# Patient Record
Sex: MALE | Race: WHITE | Employment: FULL TIME | URBAN - METROPOLITAN AREA
[De-identification: names, ages, dates, MRNs, and addresses within clinical notes are randomized per-mention and may not be internally consistent; named-entity substitution may affect disease eponyms.]

---

## 2020-12-21 ENCOUNTER — OFFICE VISIT (OUTPATIENT)
Dept: FAMILY MEDICINE | Facility: CLINIC | Age: 25
End: 2020-12-21
Payer: COMMERCIAL

## 2020-12-21 VITALS
DIASTOLIC BLOOD PRESSURE: 82 MMHG | HEART RATE: 78 BPM | RESPIRATION RATE: 12 BRPM | SYSTOLIC BLOOD PRESSURE: 128 MMHG | BODY MASS INDEX: 28.89 KG/M2 | WEIGHT: 218 LBS | HEIGHT: 73 IN | OXYGEN SATURATION: 98 % | TEMPERATURE: 97.9 F

## 2020-12-21 DIAGNOSIS — F41.1 GAD (GENERALIZED ANXIETY DISORDER): ICD-10-CM

## 2020-12-21 DIAGNOSIS — Z00.00 ROUTINE GENERAL MEDICAL EXAMINATION AT A HEALTH CARE FACILITY: Primary | ICD-10-CM

## 2020-12-21 DIAGNOSIS — F34.1 DYSTHYMIA: ICD-10-CM

## 2020-12-21 DIAGNOSIS — F33.1 MODERATE EPISODE OF RECURRENT MAJOR DEPRESSIVE DISORDER (H): ICD-10-CM

## 2020-12-21 DIAGNOSIS — R41.3 MEMORY LOSS: ICD-10-CM

## 2020-12-21 DIAGNOSIS — Z23 NEED FOR VACCINATION: ICD-10-CM

## 2020-12-21 PROBLEM — K21.9 GERD (GASTROESOPHAGEAL REFLUX DISEASE): Status: ACTIVE | Noted: 2018-05-23

## 2020-12-21 PROCEDURE — 99385 PREV VISIT NEW AGE 18-39: CPT | Mod: 25 | Performed by: PHYSICIAN ASSISTANT

## 2020-12-21 PROCEDURE — 99214 OFFICE O/P EST MOD 30 MIN: CPT | Mod: 25 | Performed by: PHYSICIAN ASSISTANT

## 2020-12-21 PROCEDURE — 90471 IMMUNIZATION ADMIN: CPT | Performed by: PHYSICIAN ASSISTANT

## 2020-12-21 PROCEDURE — 90715 TDAP VACCINE 7 YRS/> IM: CPT | Performed by: PHYSICIAN ASSISTANT

## 2020-12-21 PROCEDURE — 90686 IIV4 VACC NO PRSV 0.5 ML IM: CPT | Performed by: PHYSICIAN ASSISTANT

## 2020-12-21 PROCEDURE — 90472 IMMUNIZATION ADMIN EACH ADD: CPT | Performed by: PHYSICIAN ASSISTANT

## 2020-12-21 RX ORDER — LAMOTRIGINE 25 MG/1
TABLET ORAL
Qty: 50 TABLET | Refills: 0 | Status: SHIPPED | OUTPATIENT
Start: 2020-12-21 | End: 2021-03-08

## 2020-12-21 RX ORDER — LORAZEPAM 0.5 MG/1
TABLET ORAL
COMMUNITY
Start: 2020-05-14

## 2020-12-21 RX ORDER — LAMOTRIGINE 200 MG/1
200 TABLET ORAL
COMMUNITY
Start: 2020-12-03 | End: 2021-03-08

## 2020-12-21 RX ORDER — HYDROXYZINE HYDROCHLORIDE 25 MG/1
25 TABLET, FILM COATED ORAL
COMMUNITY
Start: 2020-05-14 | End: 2020-12-21

## 2020-12-21 RX ORDER — ALBUTEROL SULFATE 90 UG/1
2 AEROSOL, METERED RESPIRATORY (INHALATION)
COMMUNITY
Start: 2019-09-27 | End: 2021-03-08

## 2020-12-21 SDOH — HEALTH STABILITY: MENTAL HEALTH: HOW OFTEN DO YOU HAVE 6 OR MORE DRINKS ON ONE OCCASION?: NEVER

## 2020-12-21 SDOH — HEALTH STABILITY: MENTAL HEALTH: HOW OFTEN DO YOU HAVE A DRINK CONTAINING ALCOHOL?: MONTHLY OR LESS

## 2020-12-21 SDOH — HEALTH STABILITY: MENTAL HEALTH: HOW MANY STANDARD DRINKS CONTAINING ALCOHOL DO YOU HAVE ON A TYPICAL DAY?: 1 OR 2

## 2020-12-21 ASSESSMENT — MIFFLIN-ST. JEOR: SCORE: 2032.72

## 2020-12-21 ASSESSMENT — ANXIETY QUESTIONNAIRES
3. WORRYING TOO MUCH ABOUT DIFFERENT THINGS: MORE THAN HALF THE DAYS
5. BEING SO RESTLESS THAT IT IS HARD TO SIT STILL: NOT AT ALL
GAD7 TOTAL SCORE: 9
6. BECOMING EASILY ANNOYED OR IRRITABLE: SEVERAL DAYS
7. FEELING AFRAID AS IF SOMETHING AWFUL MIGHT HAPPEN: SEVERAL DAYS
1. FEELING NERVOUS, ANXIOUS, OR ON EDGE: MORE THAN HALF THE DAYS
IF YOU CHECKED OFF ANY PROBLEMS ON THIS QUESTIONNAIRE, HOW DIFFICULT HAVE THESE PROBLEMS MADE IT FOR YOU TO DO YOUR WORK, TAKE CARE OF THINGS AT HOME, OR GET ALONG WITH OTHER PEOPLE: SOMEWHAT DIFFICULT
2. NOT BEING ABLE TO STOP OR CONTROL WORRYING: MORE THAN HALF THE DAYS

## 2020-12-21 ASSESSMENT — PATIENT HEALTH QUESTIONNAIRE - PHQ9
5. POOR APPETITE OR OVEREATING: SEVERAL DAYS
SUM OF ALL RESPONSES TO PHQ QUESTIONS 1-9: 13
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
## 2020-12-21 NOTE — PATIENT INSTRUCTIONS
Taper off lamictal as follows:  Take 4 tablets (100 mg) by mouth daily for 5 days, THEN 3 tablets (75 mg) daily for 5 days, THEN 2 tablets (50 mg) daily for 5 days, THEN 1 tablet (25 mg) daily for 5 days and stop.     You can start the zoloft now at 50 mg.        Preventive Health Recommendations  Male Ages 21 - 25     Yearly exam:             See your health care provider every year in order to  o   Review health changes.   o   Discuss preventive care.    o   Review your medicines if your doctor has prescribed any.    You should be tested each year for STDs (sexually transmitted diseases).     Talk to your provider about cholesterol testing.      If you are at risk for diabetes, you should have a diabetes test (fasting glucose).    Shots: Get a flu shot each year. Get a tetanus shot every 10 years.     Nutrition:    Eat at least 5 servings of fruits and vegetables daily.     Eat whole-grain bread, whole-wheat pasta and brown rice instead of white grains and rice.     Get adequate calcium and Vitamin D.     Lifestyle    Exercise for at least 150 minutes a week (30 minutes a day, 5 days a week). This will help you control your weight and prevent disease.     Limit alcohol to one drink per day.     No smoking.     Wear sunscreen to prevent skin cancer.     See your dentist every six months for an exam and cleaning.

## 2020-12-22 ASSESSMENT — ANXIETY QUESTIONNAIRES: GAD7 TOTAL SCORE: 9

## 2021-01-28 DIAGNOSIS — F33.1 MODERATE EPISODE OF RECURRENT MAJOR DEPRESSIVE DISORDER (H): ICD-10-CM

## 2021-03-02 ENCOUNTER — TELEPHONE (OUTPATIENT)
Dept: FAMILY MEDICINE | Facility: CLINIC | Age: 26
End: 2021-03-02

## 2021-03-02 NOTE — TELEPHONE ENCOUNTER
Patient called he received 2 immunizations on 12/21/20 OV and asking for names.   He is donating plasma and they needed to know.  Patient informed he was given:   Influenza Vaccine IM > 6 months Valent IIV4   Tdap (Adacel,Boostrix)    Patient voiced understanding and agreement.  Rosa Arita RN  [facility]

## 2021-03-08 ENCOUNTER — TELEPHONE (OUTPATIENT)
Dept: FAMILY MEDICINE | Facility: CLINIC | Age: 26
End: 2021-03-08

## 2021-03-08 NOTE — TELEPHONE ENCOUNTER
Patient called today.    Patient is requesting to see Tita ESPINO at [facility] this week.    Patient has a UTI and also needs medical clearance for plasma donation and paperwork.    Patient would like to be seen in clinic.    Please contact patient today.    Thank you.    Central Scheduling  Bettye ZAMORA

## 2021-03-08 NOTE — TELEPHONE ENCOUNTER
It looks like patient was seen for the first time by Tita Tomas for routine visit 12/21/20.    Routed to Tita Tomas, schedule is full, any options to add patient on for visit for multiple issues (?UTI and paperwork for blood donation)?    Otherwise, I will offer to schedule him with an alternate provider in the near future.    Wen Orlando RN  [facility]

## 2021-03-09 NOTE — TELEPHONE ENCOUNTER
Called patient and LVM to call back and make appointment.    Jenna Artis on 3/9/2021 at 11:32 AM

## 2021-03-09 NOTE — TELEPHONE ENCOUNTER
I could see him Wednesday 3/10 at either:  7 am (take the 7:20 am slot but have him come at 7 am please)  10:20 am  OR  Thursday 9, 10:20 am or 1 pm (ok to change this one to an in office)      Otherwise, please find another provider if the above do not work this week.     Tita Tomas PA-C

## 2021-03-11 ENCOUNTER — OFFICE VISIT (OUTPATIENT)
Dept: FAMILY MEDICINE | Facility: CLINIC | Age: 26
End: 2021-03-11
Payer: COMMERCIAL

## 2021-03-11 VITALS
RESPIRATION RATE: 14 BRPM | WEIGHT: 221 LBS | DIASTOLIC BLOOD PRESSURE: 78 MMHG | BODY MASS INDEX: 29.29 KG/M2 | TEMPERATURE: 97.2 F | SYSTOLIC BLOOD PRESSURE: 100 MMHG | HEART RATE: 68 BPM | HEIGHT: 73 IN

## 2021-03-11 DIAGNOSIS — R35.0 URINARY FREQUENCY: Primary | ICD-10-CM

## 2021-03-11 DIAGNOSIS — Z11.3 SCREEN FOR STD (SEXUALLY TRANSMITTED DISEASE): ICD-10-CM

## 2021-03-11 LAB
ALBUMIN UR-MCNC: NEGATIVE MG/DL
APPEARANCE UR: CLEAR
BILIRUB UR QL STRIP: NEGATIVE
COLOR UR AUTO: YELLOW
GLUCOSE UR STRIP-MCNC: NEGATIVE MG/DL
HGB UR QL STRIP: NEGATIVE
KETONES UR STRIP-MCNC: NEGATIVE MG/DL
LEUKOCYTE ESTERASE UR QL STRIP: NEGATIVE
NITRATE UR QL: NEGATIVE
PH UR STRIP: 5.5 PH (ref 5–7)
SOURCE: NORMAL
SP GR UR STRIP: >1.03 (ref 1–1.03)
UROBILINOGEN UR STRIP-ACNC: 0.2 EU/DL (ref 0.2–1)

## 2021-03-11 PROCEDURE — 99213 OFFICE O/P EST LOW 20 MIN: CPT | Performed by: PHYSICIAN ASSISTANT

## 2021-03-11 PROCEDURE — 81003 URINALYSIS AUTO W/O SCOPE: CPT | Performed by: PHYSICIAN ASSISTANT

## 2021-03-11 PROCEDURE — 87491 CHLMYD TRACH DNA AMP PROBE: CPT | Performed by: PHYSICIAN ASSISTANT

## 2021-03-11 ASSESSMENT — ENCOUNTER SYMPTOMS
NAUSEA: 0
ABDOMINAL PAIN: 0
CONSTIPATION: 1
NERVOUS/ANXIOUS: 0
FREQUENCY: 1
HEMATOCHEZIA: 0
DYSURIA: 0
VOMITING: 0
LIGHT-HEADEDNESS: 0
FEVER: 0
SHORTNESS OF BREATH: 0
DIARRHEA: 0

## 2021-03-11 ASSESSMENT — MIFFLIN-ST. JEOR: SCORE: 2046.33

## 2021-03-11 NOTE — PROGRESS NOTES
Assessment & Plan     Urinary frequency  Patient is a 25-year-old male who presents to clinic due to increased urinary frequency at night between the time that he returns home and going to bed.  Concerns for UTI.  Vital signs normal.  Physical exam without abdominal tenderness, CVA tenderness, rebound, or guarding to suggest acute abdomen, pyelonephritis, or kidney stone.  Urinalysis negative for infection.  No hematuria present.  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  Patient notes he does often drink more fluids and carbonated water at night.  Symptoms are most likely due to increased fluid intake during that time frame.  Patient to follow-up with any new or worsening symptoms.  - UA reflex to Microscopic and Culture    Screen for STD (sexually transmitted disease)  Patient is low risk.  We will complete baseline screening per guidelines.  - Chlamydia trachomatis PCR       See Patient Instructions    Return in about 2 weeks (around 3/25/2021), or if symptoms worsen or fail to improve.    MARIA E Schaefer [facility] KATHLEEN Meléndez is a 25 year old who presents for the following health issues:    HPI       Genitourinary - Male  Onset/Duration: 4 weeks. Patient notes when at home and going to bed he feels like he has to use the bathroom every 30 minutes. Patient notes history of constipation treated with dietary changes. BM daily.   Description:   Dysuria (painful urination): no  Hematuria (blood in urine): no  Frequency: YES- before going to bed  Waking at night to urinate: no  Hesitancy (delay in urine): no  Retention (unable to empty): YES  Decrease in urinary flow: no  Incontinence: no  Progression of Symptoms:  same  Accompanying Signs & Symptoms:  Fever: no  Back/Flank pain: no  Urethral discharge: no  Testicle lumps/masses/pain: no  Nausea and/or vomiting: no  Abdominal pain: no  History:   History of frequent UTI s: no  History  "of kidney stones: no  History of hernias: no  Personal or Family history of Prostate problems: no  Sexually active: YES- No STD concerns  Precipitating or alleviating factors: None  Therapies tried and outcome: none    History of hernia/abdomina, hemorrhoid     Review of Systems   Constitutional: Negative for fever.   HENT: Negative for congestion.    Respiratory: Negative for shortness of breath.    Cardiovascular: Negative for chest pain.   Gastrointestinal: Positive for constipation (intermittent). Negative for abdominal pain, diarrhea, hematochezia, nausea and vomiting.   Genitourinary: Positive for frequency. Negative for discharge, dysuria and urgency.   Skin: Negative for rash.   Neurological: Negative for light-headedness.   Psychiatric/Behavioral: The patient is not nervous/anxious.             Objective    /78   Pulse 68   Temp 97.2  F (36.2  C) (Tympanic)   Resp 14   Ht 1.854 m (6' 1\")   Wt 100.2 kg (221 lb)   BMI 29.16 kg/m    Body mass index is 29.16 kg/m .  Physical Exam  Vitals signs and nursing note reviewed.   Constitutional:       General: He is not in acute distress.     Appearance: Normal appearance.   HENT:      Head: Normocephalic and atraumatic.      Mouth/Throat:      Mouth: Mucous membranes are moist.      Pharynx: Oropharynx is clear.   Eyes:      Extraocular Movements: Extraocular movements intact.      Pupils: Pupils are equal, round, and reactive to light.   Neck:      Musculoskeletal: Normal range of motion.   Cardiovascular:      Rate and Rhythm: Normal rate and regular rhythm.      Heart sounds: Normal heart sounds.   Pulmonary:      Effort: Pulmonary effort is normal.      Breath sounds: Normal breath sounds.   Abdominal:      General: Bowel sounds are normal. There is no distension.      Palpations: Abdomen is soft.      Tenderness: There is no abdominal tenderness. There is no right CVA tenderness, left CVA tenderness, guarding or rebound.   Musculoskeletal: Normal " range of motion.   Skin:     General: Skin is warm and dry.   Neurological:      General: No focal deficit present.      Mental Status: He is alert.   Psychiatric:         Mood and Affect: Mood normal.         Behavior: Behavior normal.            Results for orders placed or performed in visit on 03/11/21   UA reflex to Microscopic and Culture     Status: None    Specimen: Midstream Urine   Result Value Ref Range    Color Urine Yellow     Appearance Urine Clear     Glucose Urine Negative NEG^Negative mg/dL    Bilirubin Urine Negative NEG^Negative    Ketones Urine Negative NEG^Negative mg/dL    Specific Gravity Urine >1.030 1.003 - 1.035    Blood Urine Negative NEG^Negative    pH Urine 5.5 5.0 - 7.0 pH    Protein Albumin Urine Negative NEG^Negative mg/dL    Urobilinogen Urine 0.2 0.2 - 1.0 EU/dL    Nitrite Urine Negative NEG^Negative    Leukocyte Esterase Urine Negative NEG^Negative    Source Midstream Urine

## 2021-03-11 NOTE — PATIENT INSTRUCTIONS
We will complete screening lab work for urinary tract infection today.  Keep in mind that your symptoms may be related to when you are drinking fluids and liquids you are drinking.  Certain fluids such as pop/carbonated water, tea, alcohol, and coffee can be bladder irritants and he can urinate more often.    Please reach out with any questions or concerns, or if you develop new or worsening symptoms.

## 2021-03-12 LAB
C TRACH DNA SPEC QL NAA+PROBE: NEGATIVE
SPECIMEN SOURCE: NORMAL

## 2021-04-15 ENCOUNTER — PRE VISIT (OUTPATIENT)
Dept: NEUROLOGY | Facility: CLINIC | Age: 26
End: 2021-04-15

## 2021-04-15 NOTE — TELEPHONE ENCOUNTER
FUTURE VISIT INFORMATION      FUTURE VISIT INFORMATION:    Date: 4/21/2021    Time: 1030am    Location: [facility] [place]  REFERRAL INFORMATION:    Referring provider:  Self     Referring providers clinic:      Reason for visit/diagnosis  Memory Loss     RECORDS REQUESTED FROM:       Clinic name Comments Records Status Imaging Status   [facility]  MR Head-9/14/2018    CT Head-9/13/2018 Care Everywhere Requested to PACS          Internal AYANA Tomas-12/21/2020 Epic N/A                       4/15/2021-Request for images faxed to [facility] Radiology-MR @ 123pm    4/20/2021-[facility] Images now in PACS-MR @ 548am

## 2021-04-20 ASSESSMENT — ENCOUNTER SYMPTOMS
CONSTIPATION: 0
JAUNDICE: 0
RECTAL PAIN: 0
DIARRHEA: 0
BLOATING: 0
BOWEL INCONTINENCE: 0
BLOOD IN STOOL: 0
NAUSEA: 0
ABDOMINAL PAIN: 1
HEARTBURN: 0
VOMITING: 0

## 2021-04-21 ENCOUNTER — OFFICE VISIT (OUTPATIENT)
Dept: NEUROLOGY | Facility: CLINIC | Age: 26
End: 2021-04-21
Payer: COMMERCIAL

## 2021-04-21 VITALS
HEART RATE: 72 BPM | DIASTOLIC BLOOD PRESSURE: 86 MMHG | RESPIRATION RATE: 16 BRPM | OXYGEN SATURATION: 98 % | SYSTOLIC BLOOD PRESSURE: 144 MMHG

## 2021-04-21 DIAGNOSIS — R41.3 MEMORY LOSS: Primary | ICD-10-CM

## 2021-04-21 PROCEDURE — 99205 OFFICE O/P NEW HI 60 MIN: CPT | Performed by: PSYCHIATRY & NEUROLOGY

## 2021-04-21 ASSESSMENT — PAIN SCALES - GENERAL: PAINLEVEL: NO PAIN (0)
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
## 2021-04-21 NOTE — PATIENT INSTRUCTIONS
I do suspect you have some deficits in memory, and I am concerned about your changes in high-school as well as recent time loss.  While seizures could cause some of these issues, I do suspect that your depression and medications may also play a role.  We will look into seizures and your cogntiive function with further testing.    EEG (video, 3 hour, sleep deprived)  Neuropsychology mandi

## 2021-05-10 DIAGNOSIS — F33.1 MODERATE EPISODE OF RECURRENT MAJOR DEPRESSIVE DISORDER (H): ICD-10-CM

## 2021-05-10 NOTE — TELEPHONE ENCOUNTER
Date of Last Office Visit: 3/8/2021  Date of Next Office Visit: 6/15/2021  No shows since last visit: none  Cancellations since last visit: none    Medication requested: Sertraline 50 mg tablet Date last ordered: 3/8/2021 Qty: 30 Refills: 1     Review of MN ?: n/a    Lapse in medication adherence greater than 5 days?: no  If yes, call patient and gather details: no  Medication refill request verified as identical to current order?: yes  Result of Last DAM, VPA, Li+ Level, CBC, or Carbamazepine Level (at or since last visit): N/A    []Medication refilled per  Medication Refill in Ambulatory Care  policy.  [x]Medication unable to be refilled by RN due to criteria not met as indicated below:    []Eligibility - not seen in the last year   []Supervision - no future appointment   []Compliance - no shows, cancellations or lapse in therapy   []Verification - order discrepancy   []Controlled medication   []Medication not included in policy   []90-day supply request   [x]Other

## 2021-05-11 NOTE — TELEPHONE ENCOUNTER
Patient states that his insurance will only cover medications if the prescription is for a 90 day supply.  His current prescription for Sertraline is only for 30 day supply.  He is wondering if it could be re-prescribed as a 90 day supply so his insurance will cover it.    Patient uses [facility] pharmacy: [phone]    Patients best number is one listed in Epic, a message can be left if he does not answer.

## 2021-05-13 ENCOUNTER — ANCILLARY PROCEDURE (OUTPATIENT)
Dept: NEUROLOGY | Facility: CLINIC | Age: 26
End: 2021-05-13
Attending: PSYCHIATRY & NEUROLOGY
Payer: COMMERCIAL

## 2021-05-13 DIAGNOSIS — R41.3 MEMORY LOSS: ICD-10-CM

## 2021-05-13 PROCEDURE — 95718 EEG PHYS/QHP 2-12 HR W/VEEG: CPT | Performed by: PSYCHIATRY & NEUROLOGY

## 2021-05-13 PROCEDURE — 95700 EEG CONT REC W/VID EEG TECH: CPT | Performed by: PSYCHIATRY & NEUROLOGY

## 2021-05-13 PROCEDURE — 95713 VEEG 2-12 HR CONT MNTR: CPT | Performed by: PSYCHIATRY & NEUROLOGY

## 2021-06-15 ENCOUNTER — VIRTUAL VISIT (OUTPATIENT)
Dept: PSYCHIATRY | Facility: CLINIC | Age: 26
End: 2021-06-15
Payer: COMMERCIAL

## 2021-06-15 DIAGNOSIS — F33.1 MODERATE EPISODE OF RECURRENT MAJOR DEPRESSIVE DISORDER (H): Primary | ICD-10-CM

## 2021-06-15 PROCEDURE — 99213 OFFICE O/P EST LOW 20 MIN: CPT | Mod: 95 | Performed by: NURSE PRACTITIONER

## 2021-06-15 RX ORDER — SERTRALINE HYDROCHLORIDE 100 MG/1
100 TABLET, FILM COATED ORAL DAILY
Qty: 90 TABLET | Refills: 0 | Status: SHIPPED | OUTPATIENT
Start: 2021-06-15 | End: 2021-11-26

## 2021-06-15 ASSESSMENT — ANXIETY QUESTIONNAIRES
7. FEELING AFRAID AS IF SOMETHING AWFUL MIGHT HAPPEN: SEVERAL DAYS
5. BEING SO RESTLESS THAT IT IS HARD TO SIT STILL: NOT AT ALL
3. WORRYING TOO MUCH ABOUT DIFFERENT THINGS: MORE THAN HALF THE DAYS
2. NOT BEING ABLE TO STOP OR CONTROL WORRYING: SEVERAL DAYS
6. BECOMING EASILY ANNOYED OR IRRITABLE: SEVERAL DAYS
GAD7 TOTAL SCORE: 7
IF YOU CHECKED OFF ANY PROBLEMS ON THIS QUESTIONNAIRE, HOW DIFFICULT HAVE THESE PROBLEMS MADE IT FOR YOU TO DO YOUR WORK, TAKE CARE OF THINGS AT HOME, OR GET ALONG WITH OTHER PEOPLE: SOMEWHAT DIFFICULT
1. FEELING NERVOUS, ANXIOUS, OR ON EDGE: SEVERAL DAYS

## 2021-06-15 ASSESSMENT — PATIENT HEALTH QUESTIONNAIRE - PHQ9
SUM OF ALL RESPONSES TO PHQ QUESTIONS 1-9: 11
5. POOR APPETITE OR OVEREATING: SEVERAL DAYS
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
## 2021-06-16 ASSESSMENT — ANXIETY QUESTIONNAIRES: GAD7 TOTAL SCORE: 7

## 2021-07-06 ENCOUNTER — OFFICE VISIT (OUTPATIENT)
Dept: NEUROLOGY | Facility: CLINIC | Age: 26
End: 2021-07-06
Payer: COMMERCIAL

## 2021-07-06 DIAGNOSIS — F33.2 SEVERE RECURRENT MAJOR DEPRESSION WITHOUT PSYCHOTIC FEATURES (H): ICD-10-CM

## 2021-07-06 DIAGNOSIS — F06.8 OTHER SPECIFIED MENTAL DISORDERS DUE TO KNOWN PHYSIOLOGICAL CONDITION: ICD-10-CM

## 2021-07-06 DIAGNOSIS — R41.844 FRONTAL LOBE AND EXECUTIVE FUNCTION DEFICIT: Primary | ICD-10-CM

## 2021-07-06 DIAGNOSIS — F41.9 ANXIETY: ICD-10-CM

## 2021-07-06 NOTE — PROGRESS NOTES
Patient was seen for neuropsychological evaluation at the request of Dr. Jesse Dominguez, for the purposes of diagnostic clarification and treatment planning.  1 hrs 39 min of test administration and scoring were provided by this writer, Laura Mejia.  Please see Dr. Kaveh Boles's report for a full interpretation of the findings.
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
## 2021-07-07 NOTE — PROGRESS NOTES
Name: Medhat Jackman MRN: [id number]  : 1995 LATHAM: 2021  Staff: JOSE LUIS Tech: NEHEMIAH Age: 25  Sex: Male Hand: Right Educ: 13-15  Vision: 20/20 ?with correction / ?without correction    ORIENTATION     Time  -0     Place       Personal info         WAIS-IV     WMI: 95         Raw SSa     Similarities  25 10     Block Design  54 12     Digit Span  27 9      Arithmetic  13 9     Coding  64 8    CRISTEL-O    Raw    T %ile     Copy    33.0     11-16     Time to Copy  288        WRAT 4   SS      Word Reading  121      COWAT (FAS)   Raw: 52   T: 56  Animals   Raw:  20  T-score:  41    BOSTON NAMING TEST   Raw: 56   T: 46     COMPLEX IDEATIONAL MATERIAL   Raw:  T: 58    TRAILS  Raw  Err T    A 19  0 55   B 44  0 54    STROOP Raw T   Word 83 37   Color  74 46       Color/Word  46 51          FOZIA Short   Raw: 15/15 %ile: 86    GROOVED PEGBOARD    Raw  T Drops   RH  52  62 0   LH 60  56 0    BDI-II  Raw:  29  Interpretation: Severe    KIRBY  Raw:  11  Interpretation: Mild    HVLT-R Form 1      Trial 1 2 3 Total      7 11 12  30  Raw T     Total Learning (1-3) 30 53     Delayed Recall  10 47     Percent Retention 83 38     Recognition Hits/FP 12/0 58    BVMT-R Form 1     Trial 1 2 3 Total      12 12 12  36  Raw T / %ile     Total Learning (1-3) 36 68     Delayed Recall  12 60     Percent Retention 100 >16th     Recognition Hits/FP 6/0 >16th    DCT E-score: 10      MMPI-2-RF   RCd: 79 VRIN-r:  58   RC1: 56 ZOILA-r:  73T   RC2: 76 F-r:  79   RC3: 41 Fp-r:  85   RC4: 59 Fs:  66   RC6: 56 FBS-r:  67   RC7: 70 RBS:  84   RC8: 56 L-r:  42   RC9: 43 K-r:  38
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
## 2021-07-09 NOTE — PROGRESS NOTES
"[facility] ([facility])  July 13, 2021    Behavioral Health Clinician Progress Note    Patient Name: Medhat Jackman      Telemedicine Visit: The patient's condition can be safely assessed and treated via synchronous audio and visual telemedicine encounter.      Reason for Telemedicine Visit: telephone The patient has been notified of the following:      \"We have found that certain health care needs can be provided without the need for a face to face visit.  This service lets us provide the care you need with a phone conversation.       I will have full access to your [facility] medical record during this entire phone call.   I will be taking notes for your medical record.      Since this is like an office visit, we will bill your insurance company for this service.       There are potential benefits and risks of telephone visits (e.g. limits to patient confidentiality) that differ from in-person visits.?  Confidentiality still applies for telephone services, and nobody will record the visit.  It is important to be in a quiet, private space that is free of distractions (including cell phone or other devices) during the visit.??      If during the course of the call I believe a telephone visit is not appropriate, you will not be charged for this service\"     Consent has been obtained for this service by care team member: Yes     Originating Site (Patient Location): Patient's home    Distant Site (Provider Location): Provider Remote Setting- Home Office    Consent:  The patient/guardian has verbally consented to: the potential risks and benefits of telemedicine (video visit) versus in person care; bill my insurance or make self-payment for services provided; and responsibility for payment of non-covered services.     Mode of Communication:  telephone    As the provider I attest to compliance with applicable laws and regulations related to telemedicine.         Service Type:  Individual      Service " Location:   Phone call (patient / identified key support person reached)     Session Start Time: 1030am  Session End Time: 1055am      Session Length: 16 - 37      Attendees: Client    Visit Activities (Refresh list every visit): [facility] [place] Only    Diagnostic Assessment Date: 03/08/2021  See Flowsheets for today's PHQ-9 and SHIRLEY-7 results  Previous PHQ-9:   PHQ-9 SCORE 3/8/2021 6/15/2021 7/13/2021   PHQ-9 Total Score 8 11 11       Previous SHIRLEY-7:   SHIRLEY-7 SCORE 3/8/2021 6/15/2021 7/13/2021   Total Score 5 7 8       WHODAS  WHODAS 2.0 Total Score 7/13/2021   Total Score 17        AUDIT  No flowsheet data found.    DATA  Extended Session (60+ minutes): No  Interactive Complexity: No  Crisis: No    Medication Compliance:  Yes      Chemical Use Review:   Substance Use: Chemical use reviewed, no active concerns identified      Tobacco Use: No current tobacco use.      Current Stressors / Issues:  Significant improvement in mood since increased dose. Depressive episodes are shorter in duration and less intense.  Decrease SI but still happening. Has safety plan in place. Sees his therapist regularly and she is aware of this.  Feeling more positive. Increased fatigue. Has tried to improve sleep cycle, decrease caffeine intake. Gets woken up and can't get back to sleep.  Some increased irritability.    INTERVENTION:  Motivational Interviewing     MI Intervention: Co-Developed Goal: manage depression, Expressed Empathy/Understanding, Supported Autonomy, Collaboration, Evocation, Permission to raise concern or advise, Open-ended questions, Reflections: simple and complex, Change talk (evoked) and Reframe      Change Talk Expressed by the Patient: Desire to change Ability to change Reasons to change Need to change Committment to change Activation Taking steps     Provider Response to Change Talk: E - Evoked more info from patient about behavior change, A - Affirmed patient's thoughts, decisions, or attempts at behavior change, R -  Reflected patient's change talk and S - Summarized patient's change talk statements        Review of Symptoms per patient report:  Depression: Change in sleep, Change in energy level and Irritability  Aleksandra:  No Symptoms  Psychosis: No Symptoms  Anxiety: Excessive worry and Nervousness  Panic:  No symptoms  Post Traumatic Stress Disorder:  Nightmares   Eating Disorder: No Symptoms  ADD / ADHD:  No symptoms  Conduct Disorder: No symptoms  Autism Spectrum Disorder: No symptoms  Obsessive Compulsive Disorder: No Symptoms      Changes in Health Issues:   None reported    Assessment: Current Emotional / Mental Status (status of significant symptoms):  Risk status (Self / Other harm or suicidal ideation)  Patient has had a history of suicidal ideation: 2018 and homicidal ideation: 2018  Patient denies current fears or concerns for personal safety.  Patient denies current or recent suicidal ideation or behaviors.  Patient denies current or recent homicidal ideation or behaviors.  Patient denies current or recent self injurious behavior or ideation.  Patient denies other safety concerns.  A safety and risk management plan has not been developed at this time, however patient was encouraged to call [place] [phone] should there be a change in any of these risk factors.    Appearance:   unable to assess (phone)   Eye Contact:   unable to assess (phone)   Psychomotor Behavior: unable to assess (phone)   Attitude:   Cooperative   Orientation:   All  Speech   Rate / Production: Normal    Volume:  Normal   Mood:    Depressed   Affect:    unable to assess (phone)   Thought Content:  Clear   Thought Form:  Coherent  Logical   Insight:    Good     Diagnoses:  1. Moderate episode of recurrent major depressive disorder (H)        Collateral Reports Completed:  Not Applicable    Plan: (Homework, other):  Patient was given information about behavioral services and encouraged to schedule a follow up appointment with the clinic [facility] in  conjunction with next [facility] appointment.  He was also given information about mental health symptoms and treatment options , confirmed that he has a safety plan in place and And communicated with Kim Malone, MSN, APRN, CNP, [facility], [place] [facility]  CD Recommendations: No indications of CD issues.  Denise Ramos [facility]      Denise Ramos, [facility]  July 13, 2021

## 2021-07-13 ENCOUNTER — VIRTUAL VISIT (OUTPATIENT)
Dept: PSYCHIATRY | Facility: CLINIC | Age: 26
End: 2021-07-13
Payer: COMMERCIAL

## 2021-07-13 ENCOUNTER — VIRTUAL VISIT (OUTPATIENT)
Dept: BEHAVIORAL HEALTH | Facility: CLINIC | Age: 26
End: 2021-07-13
Payer: COMMERCIAL

## 2021-07-13 DIAGNOSIS — F33.1 MODERATE EPISODE OF RECURRENT MAJOR DEPRESSIVE DISORDER (H): Primary | ICD-10-CM

## 2021-07-13 PROCEDURE — 90832 PSYTX W PT 30 MINUTES: CPT | Mod: GT | Performed by: SOCIAL WORKER

## 2021-07-13 PROCEDURE — 99213 OFFICE O/P EST LOW 20 MIN: CPT | Mod: TEL | Performed by: NURSE PRACTITIONER

## 2021-07-13 ASSESSMENT — ANXIETY QUESTIONNAIRES
IF YOU CHECKED OFF ANY PROBLEMS ON THIS QUESTIONNAIRE, HOW DIFFICULT HAVE THESE PROBLEMS MADE IT FOR YOU TO DO YOUR WORK, TAKE CARE OF THINGS AT HOME, OR GET ALONG WITH OTHER PEOPLE: SOMEWHAT DIFFICULT
GAD7 TOTAL SCORE: 8
1. FEELING NERVOUS, ANXIOUS, OR ON EDGE: SEVERAL DAYS
2. NOT BEING ABLE TO STOP OR CONTROL WORRYING: MORE THAN HALF THE DAYS
3. WORRYING TOO MUCH ABOUT DIFFERENT THINGS: SEVERAL DAYS
6. BECOMING EASILY ANNOYED OR IRRITABLE: MORE THAN HALF THE DAYS
5. BEING SO RESTLESS THAT IT IS HARD TO SIT STILL: NOT AT ALL
7. FEELING AFRAID AS IF SOMETHING AWFUL MIGHT HAPPEN: SEVERAL DAYS

## 2021-07-13 ASSESSMENT — PATIENT HEALTH QUESTIONNAIRE - PHQ9
5. POOR APPETITE OR OVEREATING: SEVERAL DAYS
SUM OF ALL RESPONSES TO PHQ QUESTIONS 1-9: 11
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
## 2021-07-13 NOTE — PROGRESS NOTES
Medhat Jackman is a 25 year old male who is being evaluated via a billable telephone visit.      How would you like to obtain your AVS? MyChart  If the video visit is dropped, the invitation should be resent by: Text to cell phone: [phone]  Will anyone else be joining your video visit? No      Location of patient:  [place] If not at home address below, please ask where they are in case of an emergency situation arises during the appointment.  [address] [address]  Any new over the counter medications: No  Have you weighed yourself lately: Yes  Recent BP/HR: No  Are you taking  your medications every day: Yes

## 2021-07-13 NOTE — ASSESSMENT & PLAN NOTE
Tolerated the increase in sertraline however it may be causing fatigue during the day at this became prevalent after the increase.  100 mg has been effective in targeting mood and anxiety.  Will attempt to see if this is dose related and he will trial 75 mg.  He has enough of the 50 mg to trial this and does not need a prescription.  He will let me know in about a month if this is helpful and if he continues to be fatigued will reassess transitioning to a different antidepressant.  Then will follow-up in 1 month after change.  Schedule appointment for 2 months out

## 2021-07-14 ASSESSMENT — ANXIETY QUESTIONNAIRES: GAD7 TOTAL SCORE: 8

## 2021-09-10 NOTE — PROGRESS NOTES
"Collaborative Care Psychiatry Service (CCPS)  Sept 14, 2021    Behavioral Health Clinician Progress Note    Patient Name: Medhat Jackman      Telemedicine Visit: The patient's condition can be safely assessed and treated via synchronous audio and visual telemedicine encounter.      Reason for Telemedicine Visit: telephone The patient has been notified of the following:      \"We have found that certain health care needs can be provided without the need for a face to face visit.  This service lets us provide the care you need with a phone conversation.       I will have full access to your [facility] medical record during this entire phone call.   I will be taking notes for your medical record.      Since this is like an office visit, we will bill your insurance company for this service.       There are potential benefits and risks of telephone visits (e.g. limits to patient confidentiality) that differ from in-person visits.?  Confidentiality still applies for telephone services, and nobody will record the visit.  It is important to be in a quiet, private space that is free of distractions (including cell phone or other devices) during the visit.??      If during the course of the call I believe a telephone visit is not appropriate, you will not be charged for this service\"     Consent has been obtained for this service by care team member: Yes     Originating Site (Patient Location): Patient's home    Distant Site (Provider Location): Provider Remote Setting- Home Office    Consent:  The patient/guardian has verbally consented to: the potential risks and benefits of telemedicine (video visit) versus in person care; bill my insurance or make self-payment for services provided; and responsibility for payment of non-covered services.     Mode of Communication:  telephone    As the provider I attest to compliance with applicable laws and regulations related to telemedicine.         Service Type:  Individual      Service " "Location:   Phone call (patient / identified key support person reached)     Session Start Time: 1100am  Session End Time: 1117am      Session Length: 16 - 37      Attendees: Client    Visit Activities (Refresh list every visit): [facility] [place] Only    Diagnostic Assessment Date: 03/08/2021  See Flowsheets for today's PHQ-9 and SHIRLEY-7 results  Previous PHQ-9:   PHQ-9 SCORE 6/15/2021 7/13/2021 9/14/2021   PHQ-9 Total Score 11 11 15       Previous SHIRLEY-7:   SHIRLEY-7 SCORE 6/15/2021 7/13/2021 9/14/2021   Total Score 7 8 4       WHODAS  WHODAS 2.0 Total Score 7/13/2021   Total Score 17        AUDIT  No flowsheet data found.    DATA  Extended Session (60+ minutes): No  Interactive Complexity: No  Crisis: No    Medication Compliance:  Yes      Chemical Use Review:   Substance Use: Chemical use reviewed, no active concerns identified      Tobacco Use: No current tobacco use.      Current Stressors / Issues:  MH update: Mood for last 2 weeks mostly stable but notices that he's always \"tired and exhausted\". Sleeping well but wakes up earlier than he would like. Keeping a consistent sleep schedule. Nothing seems to help him have more energy.  Acknowledges SI but denies intent or plan.Sees therapist weekly and they discuss this. No concerns for personal safety. Has a safety plan.   HOME: CBD, minor alcohol use  Preg: NA  Most important: ongoing fatigue      INTERVENTION:  Motivational Interviewing     MI Intervention: Co-Developed Goal: manage depression, Expressed Empathy/Understanding, Supported Autonomy, Collaboration, Evocation, Permission to raise concern or advise, Open-ended questions, Reflections: simple and complex, Change talk (evoked) and Reframe      Change Talk Expressed by the Patient: Desire to change Ability to change Reasons to change Need to change Committment to change Activation Taking steps     Provider Response to Change Talk: E - Evoked more info from patient about behavior change, A - Affirmed patient's thoughts, " decisions, or attempts at behavior change, R - Reflected patient's change talk and S - Summarized patient's change talk statements        Review of Symptoms per patient report:  Depression: Change in sleep, Change in energy level and Irritability  Aleksandra:  No Symptoms  Psychosis: No Symptoms  Anxiety: Excessive worry and Nervousness  Panic:  No symptoms  Post Traumatic Stress Disorder:  Nightmares   Eating Disorder: No Symptoms  ADD / ADHD:  No symptoms  Conduct Disorder: No symptoms  Autism Spectrum Disorder: No symptoms  Obsessive Compulsive Disorder: No Symptoms      Changes in Health Issues:   None reported    Assessment: Current Emotional / Mental Status (status of significant symptoms):  Risk status (Self / Other harm or suicidal ideation)  Patient has had a history of suicidal ideation: 2018 and homicidal ideation: 2018  Patient denies current fears or concerns for personal safety.  Patient denies current or recent suicidal ideation or behaviors.  Patient denies current or recent homicidal ideation or behaviors.  Patient denies current or recent self injurious behavior or ideation.  Patient denies other safety concerns.  A safety and risk management plan has not been developed at this time, however patient was encouraged to call [place] [phone] should there be a change in any of these risk factors.    Appearance:   unable to assess (phone)   Eye Contact:   unable to assess (phone)   Psychomotor Behavior: unable to assess (phone)   Attitude:   Cooperative   Orientation:   All  Speech   Rate / Production: Normal    Volume:  Normal   Mood:    Depressed   Affect:    unable to assess (phone)   Thought Content:  Clear   Thought Form:  Coherent  Logical   Insight:    Good     Diagnoses:  1. Moderate episode of recurrent major depressive disorder (H)        Collateral Reports Completed:  communicated with Kim Malone, MSN, APRN, CNP, FMHNP-BC, [facility] [facility]    Plan: (Homework, other):    He was also given  information about mental health symptoms and treatment options , confirmed that he has a safety plan in place. Recommendations: No indications of CD issues.  Denise Ramos [facility]      Denise Ramos, [facility]  Sept 14, 2021

## 2021-09-14 ENCOUNTER — VIRTUAL VISIT (OUTPATIENT)
Dept: BEHAVIORAL HEALTH | Facility: CLINIC | Age: 26
End: 2021-09-14
Payer: COMMERCIAL

## 2021-09-14 ENCOUNTER — VIRTUAL VISIT (OUTPATIENT)
Dept: PSYCHIATRY | Facility: CLINIC | Age: 26
End: 2021-09-14
Payer: COMMERCIAL

## 2021-09-14 DIAGNOSIS — F41.1 GAD (GENERALIZED ANXIETY DISORDER): Primary | ICD-10-CM

## 2021-09-14 DIAGNOSIS — F33.1 MODERATE EPISODE OF RECURRENT MAJOR DEPRESSIVE DISORDER (H): Primary | ICD-10-CM

## 2021-09-14 PROCEDURE — 99214 OFFICE O/P EST MOD 30 MIN: CPT | Mod: 95 | Performed by: NURSE PRACTITIONER

## 2021-09-14 PROCEDURE — 90832 PSYTX W PT 30 MINUTES: CPT | Mod: 95 | Performed by: SOCIAL WORKER

## 2021-09-14 RX ORDER — VENLAFAXINE HYDROCHLORIDE 150 MG/1
150 CAPSULE, EXTENDED RELEASE ORAL DAILY
Qty: 30 CAPSULE | Refills: 0 | Status: SHIPPED | OUTPATIENT
Start: 2021-09-14 | End: 2021-11-26

## 2021-09-14 RX ORDER — VENLAFAXINE HYDROCHLORIDE 37.5 MG/1
37.5 CAPSULE, EXTENDED RELEASE ORAL DAILY
Qty: 21 CAPSULE | Refills: 0 | Status: SHIPPED | OUTPATIENT
Start: 2021-09-14 | End: 2021-11-03

## 2021-09-14 ASSESSMENT — ANXIETY QUESTIONNAIRES
3. WORRYING TOO MUCH ABOUT DIFFERENT THINGS: SEVERAL DAYS
2. NOT BEING ABLE TO STOP OR CONTROL WORRYING: NOT AT ALL
IF YOU CHECKED OFF ANY PROBLEMS ON THIS QUESTIONNAIRE, HOW DIFFICULT HAVE THESE PROBLEMS MADE IT FOR YOU TO DO YOUR WORK, TAKE CARE OF THINGS AT HOME, OR GET ALONG WITH OTHER PEOPLE: SOMEWHAT DIFFICULT
5. BEING SO RESTLESS THAT IT IS HARD TO SIT STILL: NOT AT ALL
6. BECOMING EASILY ANNOYED OR IRRITABLE: SEVERAL DAYS
7. FEELING AFRAID AS IF SOMETHING AWFUL MIGHT HAPPEN: SEVERAL DAYS
1. FEELING NERVOUS, ANXIOUS, OR ON EDGE: SEVERAL DAYS
GAD7 TOTAL SCORE: 4

## 2021-09-14 ASSESSMENT — PATIENT HEALTH QUESTIONNAIRE - PHQ9
SUM OF ALL RESPONSES TO PHQ QUESTIONS 1-9: 15
5. POOR APPETITE OR OVEREATING: NOT AT ALL

## 2021-09-14 NOTE — TELEPHONE ENCOUNTER
Patient transitioning to Venlafaxine and he stated he had enough of the sertraline to taper off with half tab for one week then discontinue.  Ok to deny.      Kim Malone, MSN, APRN, CNP, FMHNP-BC,
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
## 2021-09-14 NOTE — ASSESSMENT & PLAN NOTE
Will transition off the sertraline as he continues with residual negative symptoms of depression and concern the sertraline is numbing him.  Will trial transtitioning to an SNRI at this time.  Start Venlafaxine XR 37.5 mg and titrate to 150 mg over two weeks.  Continue remaining medications at current dosages.  Follow-up 6 weeks

## 2021-09-15 ASSESSMENT — ANXIETY QUESTIONNAIRES: GAD7 TOTAL SCORE: 4

## 2021-10-11 ENCOUNTER — HEALTH MAINTENANCE LETTER (OUTPATIENT)
Age: 26
End: 2021-10-11

## 2021-11-17 ENCOUNTER — MYC REFILL (OUTPATIENT)
Dept: PSYCHIATRY | Facility: CLINIC | Age: 26
End: 2021-11-17
Payer: COMMERCIAL

## 2021-11-17 DIAGNOSIS — F41.1 GAD (GENERALIZED ANXIETY DISORDER): ICD-10-CM

## 2021-11-18 RX ORDER — VENLAFAXINE HYDROCHLORIDE 150 MG/1
150 CAPSULE, EXTENDED RELEASE ORAL DAILY
Qty: 30 CAPSULE | Refills: 0 | OUTPATIENT
Start: 2021-11-18

## 2021-11-18 NOTE — TELEPHONE ENCOUNTER
Patient last prescribed venlafaxine 150mg on 9/14/21. Provider wanted follow up in 6 weeks after last appointment 9/14. Patient has not followed up.

## 2021-11-22 ENCOUNTER — MYC REFILL (OUTPATIENT)
Dept: PSYCHIATRY | Facility: CLINIC | Age: 26
End: 2021-11-22
Payer: COMMERCIAL

## 2021-11-22 DIAGNOSIS — F41.1 GAD (GENERALIZED ANXIETY DISORDER): ICD-10-CM

## 2021-11-22 RX ORDER — VENLAFAXINE HYDROCHLORIDE 150 MG/1
150 CAPSULE, EXTENDED RELEASE ORAL DAILY
Qty: 30 CAPSULE | Refills: 0 | Status: CANCELLED | OUTPATIENT
Start: 2021-11-22

## 2021-11-23 ENCOUNTER — VIRTUAL VISIT (OUTPATIENT)
Dept: NEUROLOGY | Facility: CLINIC | Age: 26
End: 2021-11-23
Payer: COMMERCIAL

## 2021-11-23 DIAGNOSIS — F33.2 SEVERE RECURRENT MAJOR DEPRESSION WITHOUT PSYCHOTIC FEATURES (H): Primary | ICD-10-CM

## 2021-11-23 DIAGNOSIS — R41.89 SUBJECTIVE COGNITIVE IMPAIRMENT: ICD-10-CM

## 2021-11-23 PROCEDURE — 99213 OFFICE O/P EST LOW 20 MIN: CPT | Mod: 95 | Performed by: PSYCHIATRY & NEUROLOGY

## 2021-11-23 NOTE — PROGRESS NOTES
Medhat is a 26 year old who is being evaluated via a billable video visit.      How would you like to obtain your AVS? MyChart  If the video visit is dropped, the invitation should be resent by: Send to e-mail at: [email or web address]  Will anyone else be joining your video visit? No      Video Start Time: 0700  Video-Visit Details    Type of service:  Video Visit    Video End Time:7:27 AM    Originating Location (pt. Location): Home    Distant Location (provider location):  [facility] NEUROLOGY [facility]     Platform used for Video Visit: G-volution

## 2021-11-23 NOTE — PROGRESS NOTES
[facility] Neurology Follow Up Visit    Medhat Jackman MRN# [id number]   Age: 26 year old YOB: 1995     Brief history of symptoms: The patient was initially seen in neurologic consultation on 4/21/2021 for evaluation of memory loss. Please see the comprehensive neurologic consultation notes from those dates in the Epic records for details.  The patient described forgetting words and long periods of time in the setting of longstanding psychiatric conditions (SHIRLEY, dysthymia) and prior reported head traumas.  MiniCog was 2/5, prior MRI 2018 showed no major findings.  Given his reports of memory loss while on Wellbutrin, and history of abrupt changes in cognitive function in high-school, it was thought he should obtain an EEG as well as neuropsychological testing.    Interval history:  - Neuropsychological testing was done 7/6/2021 and showed non specific patterns of weakness in subcortical brain function with high degree of suspicion for non-neurological factors affecting his results.  Continued treatment of his mental health was recommended.  - EEG was done 5/13/2021, and was normal    Today, the patient reports still having a lack of memory for certain conversations, but isn't forgetting whole chunks of a night or day.  His symptoms have lessened to some degree, but still fluctuate given his psychiatric state.  Occasional use of alcohol doesn't necessarily cause a worsened loss of memory, nor dose his use of CBD.  He feels that he is tolerating venlafaxine better than his sertraline, but still has some chronic fatigue.           Assessment and Plan:   Assessment:  Cognitive fluctuations related to psychiatric conditions     The patient doesn't have major findings of brain injury (2018 imaging, EEG, neuropsychology), and his symptoms are likely behavioral in nature at this time.  There is no evidence for prior anoxic injury or concern for atypical early onset neurodegenerative conditions at this  time.    Plan:  Follow up in Neurology clinic as needed.    WAN Dominguez D.O.   of Neurology    Total time today (18 min) in this patient encounter was spent on pre-charting, counseling and/or coordination of care.

## 2021-11-23 NOTE — TELEPHONE ENCOUNTER
Please call and schedule this patient. Please route back to RN team when scheduled so we can complete the refill request.   Thank you!

## 2021-11-23 NOTE — LETTER
11/23/2021       RE: Medhat Jackman  [address] [facility] [address]     Dear Colleague,    Thank you for referring your patient, Medhat Jackman, to the [facility] NEUROLOGY CLINIC [place] [facility]. Please see a copy of my visit note below.    [facility] Neurology Follow Up Visit    Medhat Jackman MRN# [id number]   Age: 26 year old YOB: 1995     Brief history of symptoms: The patient was initially seen in neurologic consultation on 4/21/2021 for evaluation of memory loss. Please see the comprehensive neurologic consultation notes from those dates in the Epic records for details.  The patient described forgetting words and long periods of time in the setting of longstanding psychiatric conditions (SHIRLEY, dysthymia) and prior reported head traumas.  MiniCog was 2/5, prior MRI 2018 showed no major findings.  Given his reports of memory loss while on Wellbutrin, and history of abrupt changes in cognitive function in high-school, it was thought he should obtain an EEG as well as neuropsychological testing.    Interval history:  - Neuropsychological testing was done 7/6/2021 and showed non specific patterns of weakness in subcortical brain function with high degree of suspicion for non-neurological factors affecting his results.  Continued treatment of his mental health was recommended.  - EEG was done 5/13/2021, and was normal    Today, the patient reports still having a lack of memory for certain conversations, but isn't forgetting whole chunks of a night or day.  His symptoms have lessened to some degree, but still fluctuate given his psychiatric state.  Occasional use of alcohol doesn't necessarily cause a worsened loss of memory, nor dose his use of CBD.  He feels that he is tolerating venlafaxine better than his sertraline, but still has some chronic fatigue.           Assessment and Plan:   Assessment:  Cognitive fluctuations related to psychiatric  conditions     The patient doesn't have major findings of brain injury (2018 imaging, EEG, neuropsychology), and his symptoms are likely behavioral in nature at this time.  There is no evidence for prior anoxic injury or concern for atypical early onset neurodegenerative conditions at this time.    Plan:  Follow up in Neurology clinic as needed.    WAN Dominguez D.O.   of Neurology    Total time today (18 min) in this patient encounter was spent on pre-charting, counseling and/or coordination of care.       Again, thank you for allowing me to participate in the care of your patient.      Sincerely,    Jesse Dominguez, DO

## 2022-01-19 ENCOUNTER — MYC REFILL (OUTPATIENT)
Dept: PSYCHIATRY | Facility: CLINIC | Age: 27
End: 2022-01-19
Payer: COMMERCIAL

## 2022-01-19 DIAGNOSIS — F41.1 GAD (GENERALIZED ANXIETY DISORDER): ICD-10-CM

## 2022-01-19 RX ORDER — VENLAFAXINE HYDROCHLORIDE 150 MG/1
150 CAPSULE, EXTENDED RELEASE ORAL DAILY
Qty: 30 CAPSULE | Refills: 0 | Status: CANCELLED | OUTPATIENT
Start: 2022-01-19

## 2022-01-20 ENCOUNTER — MYC REFILL (OUTPATIENT)
Dept: PSYCHIATRY | Facility: CLINIC | Age: 27
End: 2022-01-20
Payer: COMMERCIAL

## 2022-01-20 DIAGNOSIS — F41.1 GAD (GENERALIZED ANXIETY DISORDER): ICD-10-CM

## 2022-01-20 RX ORDER — VENLAFAXINE HYDROCHLORIDE 150 MG/1
150 CAPSULE, EXTENDED RELEASE ORAL DAILY
Qty: 30 CAPSULE | Refills: 0 | Status: CANCELLED | OUTPATIENT
Start: 2022-01-20

## 2022-01-20 NOTE — TELEPHONE ENCOUNTER
Reason for call:  Medication   If this is a refill request, has the caller requested the refill from the pharmacy already? Yes  Will the patient be using a [facility]? No  Name of the pharmacy and phone number for the current request: [facility]    Name of the medication requested: Same     Other request: Pt hugo F/U     Phone number to reach patient:  Home number on file [phone] (home)    Best Time:  any    Can we leave a detailed message on this number?  YES    Travel screening: Not Applicable

## 2022-01-20 NOTE — TELEPHONE ENCOUNTER
RN left message that refill request has been sent to provider since patient has upcoming appt with provider.  Martha Ross RN on 1/20/2022 at 1:34 PM

## 2022-01-20 NOTE — TELEPHONE ENCOUNTER
Patient must make appointment with provider for refill.     Matrha Ross RN on 1/20/2022 at 10:45 AM

## 2022-01-30 ENCOUNTER — HEALTH MAINTENANCE LETTER (OUTPATIENT)
Age: 27
End: 2022-01-30

## 2022-09-24 ENCOUNTER — HEALTH MAINTENANCE LETTER (OUTPATIENT)
Age: 27
End: 2022-09-24

## 2023-05-08 ENCOUNTER — HEALTH MAINTENANCE LETTER (OUTPATIENT)
Age: 28
End: 2023-05-08

## 2024-04-13 NOTE — CONFIDENTIAL NOTE
Routing refill request to provider for review/approval because:  PHQ-9 score:    PHQ 12/21/2020   PHQ-9 Total Score 13   Q9: Thoughts of better off dead/self-harm past 2 weeks Not at all                       
Nathalie Craig MD

## 2024-05-11 ENCOUNTER — HEALTH MAINTENANCE LETTER (OUTPATIENT)
Age: 29
End: 2024-05-11